# Patient Record
Sex: MALE | Race: WHITE
[De-identification: names, ages, dates, MRNs, and addresses within clinical notes are randomized per-mention and may not be internally consistent; named-entity substitution may affect disease eponyms.]

---

## 2018-01-23 ENCOUNTER — HOSPITAL ENCOUNTER (EMERGENCY)
Dept: HOSPITAL 17 - NEPD | Age: 68
Discharge: HOME | End: 2018-01-23
Payer: MEDICARE

## 2018-01-23 VITALS
OXYGEN SATURATION: 98 % | HEART RATE: 79 BPM | SYSTOLIC BLOOD PRESSURE: 109 MMHG | TEMPERATURE: 98.2 F | RESPIRATION RATE: 15 BRPM | DIASTOLIC BLOOD PRESSURE: 73 MMHG

## 2018-01-23 VITALS — BODY MASS INDEX: 27.05 KG/M2 | HEIGHT: 66.5 IN | WEIGHT: 170.35 LBS

## 2018-01-23 DIAGNOSIS — Z87.891: ICD-10-CM

## 2018-01-23 DIAGNOSIS — L03.211: Primary | ICD-10-CM

## 2018-01-23 DIAGNOSIS — F41.9: ICD-10-CM

## 2018-01-23 DIAGNOSIS — F32.9: ICD-10-CM

## 2018-01-23 PROCEDURE — 99284 EMERGENCY DEPT VISIT MOD MDM: CPT

## 2018-01-23 NOTE — PD
HPI


Chief Complaint:  Eye Problems/Injury


Time Seen by Provider:  17:08


Travel History


International Travel<30 days:  No


Contact w/Intl Traveler<30days:  No


Traveled to known affect area:  No





History of Present Illness


HPI


67-year-old male here for evaluation of left facial rash and periorbital 

swelling.  The patient reports that he first noticed a rash about 2 weeks ago 

and states it was nonpainful.  States that the rash has not gone away and he 

has had increasing swelling to his eyelids.  He denies eye pain.  No change in 

vision.  No fevers or chills.





PFSH


Past Medical History


Anxiety:  Yes


Depression:  Yes


Diminished Hearing:  No





Past Surgical History


Appendectomy:  Yes





Social History


Alcohol Use:  No


Tobacco Use:  No (quit in June, smoked from age 18)


Substance Use:  No





Allergies-Medications


(Allergen,Severity, Reaction):  


Coded Allergies:  


     No Known Allergies (Unverified , 8/18/16)


Reported Meds & Prescriptions





Reported Meds & Active Scripts


Active


No Active Prescriptions or Reported Medications    








Review of Systems


Except as stated in HPI:  all other systems reviewed are Neg





Physical Exam


Narrative


GENERAL: Well-developed, well-nourished, comfortable, no apparent distress.


SKIN: Left face in V1 distribution and there is a follicular rash with several 

pustules with mild surrounding erythema, no fluctuance or induration, no 

ulcerative lesions.  There is mild periorbital edema on the left.


HEAD: Atraumatic. Normocephalic. 


EYES: Pupils equal, round, 4 mm, reactive to light.  EOMI.  No proptosis.  Mild 

to moderate left periorbital edema with skin exam as above.  Bilateral 

fluorescein stain shows no corneal ulcerations or lesions.


ENT: Mucous membranes pink and moist.


NECK: Trachea midline. No JVD. 


CARDIOVASCULAR: Regular rate and rhythm.  


RESPIRATORY: No accessory muscle use. Clear to auscultation. Breath sounds 

equal bilaterally. 


MUSCULOSKELETAL: No obvious deformities. No clubbing.  No cyanosis.  No edema. 


NEUROLOGICAL: Awake and alert. No obvious cranial nerve deficits.  Motor 

grossly within normal limits. Normal speech.


PSYCHIATRIC: Appropriate mood and affect; insight and judgment normal.





Data


Data


Last Documented VS





Vital Signs








  Date Time  Temp Pulse Resp B/P (MAP) Pulse Ox O2 Delivery O2 Flow Rate FiO2


 


1/23/18 16:25 98.2 79 15 109/73 (85) 98   








Orders





 Orders


Sulfamet-Trimeth Ds 800-160 Mg (Bactrim (1/23/18 17:30)


Cephalexin (Keflex) (1/23/18 17:30)


Mupirocin 2% Oint (Bactroban 2% Oint) (1/23/18 17:30)








[facility]


Medical Decision Making


Medical Screen Exam Complete:  Yes


Emergency Medical Condition:  Yes


Differential Diagnosis


Shingles, folliculitis, cellulitis, impetigo


Narrative Course


Vital signs reviewed and are within normal limits.





Patient has a folliculitis to his left face in the V1 distribution with some 

honeycombing and signs of impetigo.  He denies visual changes.  Bilateral 

fluorosceine stain shows no corneal ulcerations or lesions.  Patient first 

noticed a rash to his left forehead 2 weeks ago.  It appears that he may have 

had shingles and now has a bacterial skin infection.  Plan is to start him on 

Bactrim and Keflex as well as topical mupirocin.  PMD follow-up this week.  He 

was advised on when to return to the emergency department.  He verbalizes 

understanding and agreement with plan.





Diagnosis





 Primary Impression:  


 Cellulitis, face


Referrals:  


[facility]


3 days





Primary Care Physician


3 days





***Additional Instructions:  


Take antibiotics as prescribed.


Follow-up with a primary care physician this week.


Return to the emergency department for worsening symptoms or any other concerns.


Scripts


Mupirocin Topical (Mupirocin Topical) 2 % Oint


1 APPLIC TOPICAL BID for Mgmt Bacterial Infection, #1 TUBE 0 Refills


   Prov: Jesus Jacques MD         1/23/18 


Cephalexin (Keflex) 500 Mg Cap


500 MG PO Q8H for Infection, #30 CAP 0 Refills


   Prov: Jesus Jacques MD         1/23/18 


Sulfamethoxazole-Trimethoprim (Bactrim DS) 800-160 Mg Tab


1 TAB PO BID for Infection, #20 TAB 0 Refills


   Prov: Jesus Jacques MD         1/23/18


Disposition:  01 DISCHARGE HOME


Condition:  Stable











Jesus Jacques MD Jan 23, 2018 17:26